# Patient Record
Sex: FEMALE | Employment: OTHER | URBAN - METROPOLITAN AREA
[De-identification: names, ages, dates, MRNs, and addresses within clinical notes are randomized per-mention and may not be internally consistent; named-entity substitution may affect disease eponyms.]

---

## 2021-02-22 ENCOUNTER — IMMUNIZATION (OUTPATIENT)
Dept: PEDIATRICS | Facility: CLINIC | Age: 81
End: 2021-02-22
Payer: COMMERCIAL

## 2021-02-22 PROCEDURE — 91300 PR COVID VAC PFIZER DIL RECON 30 MCG/0.3 ML IM: CPT

## 2021-02-22 PROCEDURE — 0001A PR COVID VAC PFIZER DIL RECON 30 MCG/0.3 ML IM: CPT

## 2021-03-15 ENCOUNTER — IMMUNIZATION (OUTPATIENT)
Dept: PEDIATRICS | Facility: CLINIC | Age: 81
End: 2021-03-15
Attending: INTERNAL MEDICINE
Payer: COMMERCIAL

## 2021-03-15 PROCEDURE — 91300 PR COVID VAC PFIZER DIL RECON 30 MCG/0.3 ML IM: CPT

## 2021-03-15 PROCEDURE — 0002A PR COVID VAC PFIZER DIL RECON 30 MCG/0.3 ML IM: CPT

## 2021-03-20 ENCOUNTER — HEALTH MAINTENANCE LETTER (OUTPATIENT)
Age: 81
End: 2021-03-20

## 2021-09-04 ENCOUNTER — HEALTH MAINTENANCE LETTER (OUTPATIENT)
Age: 81
End: 2021-09-04

## 2021-12-08 ENCOUNTER — TELEPHONE (OUTPATIENT)
Dept: CARDIOLOGY | Facility: CLINIC | Age: 81
End: 2021-12-08
Payer: COMMERCIAL

## 2021-12-08 NOTE — TELEPHONE ENCOUNTER
Pt called back- she is going to call her insurance company to see if the cover the visit and then call back to schedule.

## 2022-01-11 ENCOUNTER — OFFICE VISIT (OUTPATIENT)
Dept: CARDIOLOGY | Facility: CLINIC | Age: 82
End: 2022-01-11
Attending: GENETIC COUNSELOR, MS
Payer: COMMERCIAL

## 2022-01-11 DIAGNOSIS — I42.8 LEFT VENTRICULAR NON-COMPACTION CARDIOMYOPATHY (H): Primary | ICD-10-CM

## 2022-01-11 PROCEDURE — 96040 HC GENETIC COUNSELING, EACH 30 MINUTES: CPT | Performed by: GENETIC COUNSELOR, MS

## 2022-01-11 NOTE — PROGRESS NOTES
Here is a copy of the progress note from your recent genetic counseling visit to [facility] on Date: 2022.    PROGRESS NOTE: Abby was referred by Dr. Cueto at [facility] for genetic counseling due to her recent diagnosis of left ventricular non-compaction (LVNC).  I had the opportunity to talk with Abby today to discuss the genetic component of LVNC and testing options available to her .     MEDICAL HISTORY: Abby was recently diagnosed with LVNC after an EKG at wellness check with her PCP showed abnormalities. Results of follow up testing, echo and nuclear stress test, showed reduced ejection fraction with abnormal perfusion possibly due to her conduction abnormality. 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 Compared to the prior echocardiogram, ejection fraction has improved to 44%.  In hindsight, she reports fatigue and shortness of breath. She also reports lower extremity edema due to varicose veins.    FAMILY HISTORY:A detailed family history was obtained today and was significant for the following cardiac history:      Mother  at 77 years. Abby states that death certificate listed cardiomyopathy, enlarged heart, and arrhythmia as cause of death.  She had a pacemaker, was on medications, and had a few strokes when alterations were made to the pacemaker.    Two maternal uncles had arrhythmias and pacemakers, one of them was known to have a low ejection fraction.  There were 9 other aunts and uncles but no known heart issues.      Two maternal first cousins (brothers)  suddenly in their 30-40's.    Maternal grandfather  in his 30-40's from lung issues.  He was a .  Grandmother  in her 80's.    Father  at 85 years.  He had a history of diabetes and heart issues, but details are unknown.  Little is known about his siblings or parents.    Abby has three paternal half siblings (their mother was the sister to  Abby's mother and she  in her 20's from a skin infection).  The half brother  in his 80's from heart issues, a half sister  at 78 years with diabetes and lower extremity edema (she had a son die suddenly in his 40's and another son with heart problems), and the other half sister  at 80 years with arrhythmia.    Abby has 11 other siblings.  A brother was stillborn, another brother  at 53 with cirrhosis of the liver, another  in his 50's after a transplant for hepatitis C didn't work, and her sister  last year from COVID.  Three living brother have a history of blood clots, another brother and sister have possible heart problems.    Abby does not have any children.  There is no additional history of cardiomyopathy, arrhythmias, heart attacks, fainting, sudden cardiac death, genetic conditions, or birth defects. (Scanned pedigree may be under media tab)    DISCUSSION:  Left ventricular non-compaction (LVNC) occurs when the trabeculations, or finger like projections, of the heart muscle do not compact in the left ventricle as they should during development.  Individuals with LVNC may also have another type of heart muscle disease, such as dilated cardiomyopathy (DCM). Symptoms may include abnormal heart rhythm, shortness of breath and fatigue, dizziness or light-headedness, and fainting, but can also be asymptomatic. LVNC is largely genetic.      Reviewed autosomal dominant (AD) inheritance pattern most commonly associated with LVNC, including the 50% risk for recurrence. Explained that LVNC gene mutations are associated with reduced penetrance and variable expressivity, meaning that individuals who carry a gene mutation may or may not get the disease and onset and severity can vary from one family member to the next. This explains why you may not see cardiomyopathy in each generation of a family.     Currently over 40 genes have been found to be related to LVNC/DCM. Genetic testing  currently identifies mutations in about 40% of idiopathic DCM cases. Detection rates for LVNC are not well defined. Reviewed capabilities, limitations, and logistics of testing.  DNA sample via saliva or blood is collected and sent to testing lab for evaluation of selected genes. The results could directly impact care and treatment.      Explained three possible outcomes of genetic testing including: positive identification of a mutation, no mutation identified, and identification of a variant of unknown significance (VUS). If a mutation is identified, presymptomatic testing would be available to at risk family members. If no mutation is identified, it does not rule out the possibility of a genetic component to this disease. Family members could still be at risk for developing the same condition. If a VUS is identified, it is unclear if the mutation is disease causing or just a normal variation. It may take time and possibly additional testing to determine the meaning of a VUS result.     Test results take approximately 2-4 weeks on average. Discussed cost of testing through commercial labs. Explained that the lab works with insurance to determine coverage and will contact patient if out of pocket costs are expected to exceed $100. With Medicare and a supplemental insurance, testing is likely to be covered in full.    Discussed pros and cons of genetic testing. Explained that results could determine the cause for LVNC. If a mutation is identified, presymptomatic testing is available to all at risk relatives. Reviewed possible issues associated with presymptomatic testing including genetic discrimination, current laws to prevent discrimination (ie. REE), insurance issues, and emotional and psychosocial outcomes of testing.     Explained that clinical evaluation is recommended for all first degree relatives (parents, siblings, and children) of an affected individual regardless of decision to pursue genetic testing.  Clinical evaluation should be performed on a regular basis and may include history, cardiac exam, ECHO, EKG, and possibly and MRI.    All questions answered at this time.     PLAN:Abby elected to proceed with genetic testing.  Requisition and consent forms were completed and signed.  DNA will be collected via cheek swab sample and sent to [facility]  laboratory. I will contact patient when results are available.    TOTAL TIME SPENT IN COUNSELIN minutes    Martha Burgess MS, [facility]  Licensed, Certified Genetic Counselor  [facility]

## 2022-01-11 NOTE — LETTER
Date:January 12, 2022      Patient was self referred, no letter generated. Do not send.        [facility] Health Information

## 2022-01-11 NOTE — PATIENT INSTRUCTIONS
"Indication for Genetic Counseling:    Left ventricular non-compaction (LVNC) occurs when the trabeculations, or finger like projections, of the heart muscle do not compact in the left ventricle as they should during development.  Individuals with LVNC may also have another type of heart muscle disease, such as dilated cardiomyopathy (DCM). Symptoms may include abnormal heart rhythm, shortness of breath and fatigue, dizziness or light-headedness, and fainting, but can also be asymptomatic. LVNC is largely genetic.      Inheritance:   Humans have over 20,000 genes that instruct our bodies how to function.  We have two copies of each gene because we inherit one from our mother and one from our father.  In most cardiac cases with a genetic component, the condition is inherited in an autosomal dominant (AD) pattern.  This means that in order to have the condition, a person needs to inherit a mutation on one copy of a particular gene.  This mutation or pathogenic variant dominates the \"normal\" working copy of the gene.  When an affected individual has children, they can either pass on the \"normal\" copy of the gene or the mutation.  Therefore, children have a 50% chance of inheriting the mutation.  Other family members also have an increased risk but the specific risk depends on the degree of relationship.  Additional inheritance patterns can occur within families and may alter the risk of recurrence.     Testing Options:   Genetic testing is available to assess a panel of genes known to cause this condition.  This test reads through the DNA (sequencing) of these genes to look for spelling mistakes or mutations that could cause the condition.      There are three types of results you could receive from this test.     -Positive result (mutation/pathogenic variant identified) - confirms diagnosis and provides an answer to why this happened.  In addition, identifying a mutation allows family members to have testing to " determine their risk.     -Negative result (mutation not identified) - no genetic changes were identified.  This does not rule out a genetic cause for the condition as the genetic testing only identifies 40-50% of genetic causes for this condition.    -Variant of uncertain significance (VUS) - a genetic change was identified, but there is not enough information to determine whether it is disease-causing or normal human genetic variation.     Although genetic testing may identify a mutation, it cannot provide information about the severity of symptoms or the progression of disease.  We cannot predict age of onset or severity of symptoms due to reduced penetrance and variable expressivity.    Logistics:   Genetic test involves test a sample of DNA, thru blood, saliva, or cheek cells.  The sample will be sent to a laboratory to extract the DNA and sequence the genes for mutations.  The laboratory will work with your insurance company to determine the out of pocket (OOP) cost and will notify you if the OOP cost is greater than $100.  Remember to ask the lab about financial assistance pricing and self pay options as well.  Sometimes those are much lower than insurance pricing.  When testing is initiated, results take about 2-4 weeks to return. I will contact you over the phone when results are available.     Genetic Information and Nondiscrimination Act:  The Genetic Information and Nondiscrimination Act of 2008 (REE) is a federal law that protects individuals from genetic discrimination in health insurance and employment. Genetic discrimination is defined as the misuse of genetic information. This law does not address potential discrimination regarding life insurance or disability insurance.      This is especially relevant for at risk individuals who are considering presymptomatic testing.    Screening Recommendations:  Explained that clinical evaluation is recommended for all first degree relatives (parents,  siblings, and children) of an affected individual regardless of decision to pursue genetic testing. Based on the Heart Failure Society of [place] Practice Guidelines (Tosha et al, 2009), clinical evaluation should be performed at least every 3-5 years beginning and childhood and should include history, cardiac exam, ECHO, and EKG.    Resources:  Cardiomyopathy  Hypertrophic Cardiomyopathy Association - 4hcm.org  Children's Cardiomyopathy Foundation - childrenscardiomyopathy.org  [place] Cardiomyopathy Association - cardiomyopathy.org  Dilated Cardiomyopathy Foundation - DCMfoundation.org    Arrhythmia  Heart Rhythm Society - HRSonline.org    General   American Heart Association - americanheart.org  Genetics Home Reference - r.nlm.nih.gov  Genetic Information and Nondiscrimination Act - Bookigee.org    Contact Information:  Martha Burgess MS  Licensed Genetic Counselor  [facility]  [facility]    Office:  [phone]  Appointments:  [phone]  Fax: [phone]  Email: [email or web address]

## 2022-01-11 NOTE — LETTER
2022      RE: Abby Tariq  [address] [facility] [address]       Dear Colleague,    Thank you for the opportunity to participate in the care of your patient, Abby Tariq, at the [facility] [place] [facility]. Please see a copy of my visit note below.    Here is a copy of the progress note from your recent genetic counseling visit to [facility] on Date: 2022.    PROGRESS NOTE: Abby was referred by Dr. Cueto at [facility] for genetic counseling due to her recent diagnosis of left ventricular non-compaction (LVNC).  I had the opportunity to talk with Abby today to discuss the genetic component of LVNC and testing options available to her .     MEDICAL HISTORY: Abby was recently diagnosed with LVNC after an EKG at wellness check with her PCP showed abnormalities. Results of follow up testing, echo and nuclear stress test, showed reduced ejection fraction with abnormal perfusion possibly due to her conduction abnormality. 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 Compared to the prior echocardiogram, ejection fraction has improved to 44%.  In hindsight, she reports fatigue and shortness of breath. She also reports lower extremity edema due to varicose veins.    FAMILY HISTORY:A detailed family history was obtained today and was significant for the following cardiac history:      Mother  at 77 years. Abby states that death certificate listed cardiomyopathy, enlarged heart, and arrhythmia as cause of death.  She had a pacemaker, was on medications, and had a few strokes when alterations were made to the pacemaker.    Two maternal uncles had arrhythmias and pacemakers, one of them was known to have a low ejection fraction.  There were 9 other aunts and uncles but no known heart issues.      Two maternal first cousins (brothers)  suddenly in their  30-40's.    Maternal grandfather  in his 30-40's from lung issues.  He was a .  Grandmother  in her 80's.    Father  at 85 years.  He had a history of diabetes and heart issues, but details are unknown.  Little is known about his siblings or parents.    Abby has three paternal half siblings (their mother was the sister to Abby's mother and she  in her 20's from a skin infection).  The half brother  in his 80's from heart issues, a half sister  at 78 years with diabetes and lower extremity edema (she had a son die suddenly in his 40's and another son with heart problems), and the other half sister  at 80 years with arrhythmia.    Abby has 11 other siblings.  A brother was stillborn, another brother  at 53 with cirrhosis of the liver, another  in his 50's after a transplant for hepatitis C didn't work, and her sister  last year from COVID.  Three living brother have a history of blood clots, another brother and sister have possible heart problems.    Abby does not have any children.  There is no additional history of cardiomyopathy, arrhythmias, heart attacks, fainting, sudden cardiac death, genetic conditions, or birth defects. (Scanned pedigree may be under media tab)    DISCUSSION:  Left ventricular non-compaction (LVNC) occurs when the trabeculations, or finger like projections, of the heart muscle do not compact in the left ventricle as they should during development.  Individuals with LVNC may also have another type of heart muscle disease, such as dilated cardiomyopathy (DCM). Symptoms may include abnormal heart rhythm, shortness of breath and fatigue, dizziness or light-headedness, and fainting, but can also be asymptomatic. LVNC is largely genetic.      Reviewed autosomal dominant (AD) inheritance pattern most commonly associated with LVNC, including the 50% risk for recurrence. Explained that LVNC gene mutations are associated with reduced penetrance and  variable expressivity, meaning that individuals who carry a gene mutation may or may not get the disease and onset and severity can vary from one family member to the next. This explains why you may not see cardiomyopathy in each generation of a family.     Currently over 40 genes have been found to be related to LVNC/DCM. Genetic testing currently identifies mutations in about 40% of idiopathic DCM cases. Detection rates for LVNC are not well defined. Reviewed capabilities, limitations, and logistics of testing.  DNA sample via saliva or blood is collected and sent to testing lab for evaluation of selected genes. The results could directly impact care and treatment.      Explained three possible outcomes of genetic testing including: positive identification of a mutation, no mutation identified, and identification of a variant of unknown significance (VUS). If a mutation is identified, presymptomatic testing would be available to at risk family members. If no mutation is identified, it does not rule out the possibility of a genetic component to this disease. Family members could still be at risk for developing the same condition. If a VUS is identified, it is unclear if the mutation is disease causing or just a normal variation. It may take time and possibly additional testing to determine the meaning of a VUS result.     Test results take approximately 2-4 weeks on average. Discussed cost of testing through commercial labs. Explained that the lab works with insurance to determine coverage and will contact patient if out of pocket costs are expected to exceed $100. With Medicare and a supplemental insurance, testing is likely to be covered in full.    Discussed pros and cons of genetic testing. Explained that results could determine the cause for LVNC. If a mutation is identified, presymptomatic testing is available to all at risk relatives. Reviewed possible issues associated with presymptomatic testing including  genetic discrimination, current laws to prevent discrimination (ie. REE), insurance issues, and emotional and psychosocial outcomes of testing.     Explained that clinical evaluation is recommended for all first degree relatives (parents, siblings, and children) of an affected individual regardless of decision to pursue genetic testing. Clinical evaluation should be performed on a regular basis and may include history, cardiac exam, ECHO, EKG, and possibly and MRI.    All questions answered at this time.     PLAN:Abby elected to proceed with genetic testing.  Requisition and consent forms were completed and signed.  DNA will be collected via cheek swab sample and sent to [facility]  laboratory. I will contact patient when results are available.    TOTAL TIME SPENT IN COUNSELIN minutes    Martha Burgess MS, [facility]  Licensed, Certified Genetic Counselor  [facility]       Please do not hesitate to contact me if you have any questions/concerns.     Sincerely,     Martha Burgess GC

## 2022-02-07 ENCOUNTER — TELEPHONE (OUTPATIENT)
Dept: CARDIOLOGY | Facility: CLINIC | Age: 82
End: 2022-02-07
Payer: COMMERCIAL

## 2022-02-16 NOTE — TELEPHONE ENCOUNTER
Spoke with Abby today to review results of genetic testing. She underwent genetic testing for the DCM/LVNC panel. DNA was collected via cheek swab on January 26, 2022 and sent to [facility]  laboratory.   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  A variant of unknown significance means that there is a genetic change in which we do not have enough information to determine if it is disease causing or not. Labs review published data, functional studies, presences in population databases, similarity to normal sequence, and computer prediction models.    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  This variant has been found in population databases (0.007%) and has been previously reported in individual(s) with related conditions.  Computer prediction models pare inconsistent. At this time, there is not enough current information to be certain if this variant alone can cause to disease or not.   Risk to relatives could still be as high as 50% but genetic testing is not predictive or recommended because we cannot interpret the results and make predictions.    Reviewed recommendation for cardiac screening in all first degree relatives (parents, siblings, children).  Clinical screening should include physical exam with a cardiologist, history, EKG, and ECHO. In addition, Holter montior and MRI may be recommended.  A summary letter and copy of the results will be sent to patient. All questions answered at this time.  Martha Burgess MS, [facility]  Licensed, Certified Genetic Counselor  [facility]  [facility]

## 2022-04-10 ENCOUNTER — HEALTH MAINTENANCE LETTER (OUTPATIENT)
Age: 82
End: 2022-04-10

## 2022-10-16 ENCOUNTER — HEALTH MAINTENANCE LETTER (OUTPATIENT)
Age: 82
End: 2022-10-16

## 2023-11-04 ENCOUNTER — HEALTH MAINTENANCE LETTER (OUTPATIENT)
Age: 83
End: 2023-11-04

## 2024-12-22 ENCOUNTER — HEALTH MAINTENANCE LETTER (OUTPATIENT)
Age: 84
End: 2024-12-22